# Patient Record
Sex: FEMALE | Race: WHITE
[De-identification: names, ages, dates, MRNs, and addresses within clinical notes are randomized per-mention and may not be internally consistent; named-entity substitution may affect disease eponyms.]

---

## 2018-11-26 ENCOUNTER — HOSPITAL ENCOUNTER (OUTPATIENT)
Dept: HOSPITAL 47 - OR | Age: 48
Discharge: HOME | End: 2018-11-26
Attending: SURGERY
Payer: COMMERCIAL

## 2018-11-26 VITALS — TEMPERATURE: 97 F

## 2018-11-26 VITALS — SYSTOLIC BLOOD PRESSURE: 99 MMHG | DIASTOLIC BLOOD PRESSURE: 65 MMHG | HEART RATE: 76 BPM

## 2018-11-26 VITALS — RESPIRATION RATE: 16 BRPM

## 2018-11-26 VITALS — BODY MASS INDEX: 33.6 KG/M2

## 2018-11-26 DIAGNOSIS — E07.9: ICD-10-CM

## 2018-11-26 DIAGNOSIS — J45.909: ICD-10-CM

## 2018-11-26 DIAGNOSIS — F41.9: ICD-10-CM

## 2018-11-26 DIAGNOSIS — Z79.899: ICD-10-CM

## 2018-11-26 DIAGNOSIS — K80.10: Primary | ICD-10-CM

## 2018-11-26 DIAGNOSIS — F32.9: ICD-10-CM

## 2018-11-26 DIAGNOSIS — Z79.890: ICD-10-CM

## 2018-11-26 DIAGNOSIS — E28.2: ICD-10-CM

## 2018-11-26 DIAGNOSIS — Z79.84: ICD-10-CM

## 2018-11-26 DIAGNOSIS — I10: ICD-10-CM

## 2018-11-26 DIAGNOSIS — Z88.5: ICD-10-CM

## 2018-11-26 DIAGNOSIS — Z79.51: ICD-10-CM

## 2018-11-26 LAB — GLUCOSE BLD-MCNC: 105 MG/DL (ref 75–99)

## 2018-11-26 PROCEDURE — 81025 URINE PREGNANCY TEST: CPT

## 2018-11-26 PROCEDURE — 88304 TISSUE EXAM BY PATHOLOGIST: CPT

## 2018-11-26 PROCEDURE — 47562 LAPAROSCOPIC CHOLECYSTECTOMY: CPT

## 2018-11-26 RX ADMIN — HYDROMORPHONE HYDROCHLORIDE PRN MG: 1 INJECTION, SOLUTION INTRAMUSCULAR; INTRAVENOUS; SUBCUTANEOUS at 10:56

## 2018-11-26 RX ADMIN — HYDROMORPHONE HYDROCHLORIDE PRN MG: 1 INJECTION, SOLUTION INTRAMUSCULAR; INTRAVENOUS; SUBCUTANEOUS at 11:33

## 2018-11-26 NOTE — P.OP
Date of Procedure: 11/26/18


Preoperative Diagnosis: 


Cholecystitis





Cholelithiasis


Postoperative Diagnosis: 


Cholecystitis





Cholelithiasis


Procedure(s) Performed: 


Laparoscopic cholecystectomy


Anesthesia: KEVIN


Surgeon: Rashawn Galindo


Pathology: other (Gallbladder)


Condition: stable


Disposition: PACU


Description of Procedure: 


The patient was placed on the operating table.   The patient received a general 

endotracheal tube anesthesia.    The patients abdomen was prepped and draped 

in the usual sterile fashion.    Through an infraumbilical stab incision, the 

fascia of the anterior abdominal wall was grasped with a pair of Kochers and 

then the Veress needle was placed in the peritoneal cavity.   Position of the 

Veress needle was confirmed with positive drop test.   The abdomen was then 

insufflated.  After adequate insufflation, the 10 mm trocar was placed in the 

peritoneal cavity.    Following this the laparoscope was placed in the 

peritoneal cavity. The patient was placed in the head-up, right side up 

position and then a 5 mm trocar was placed in the right lateral and right 

subcostal position under direct visualization.    A 8 mm trocar was placed in 

the epigastric position.  The gallbladder was grasped in the fundus and 

infundibulum.  Traction  on the gallbladder was placed in the lateral and the 

cephalad positions.  The triangle of Calot  was visualized..   The cystic duct 

was bluntly dissected until the union of the cystic duct and common bile duct 

was seen.    The cystic duct was then divided and sealed with the Harmonic 

scissors.  A PDS Endoloop was then placed throughout the cystic duct stump.  

The cystic artery divided and sealed with the Harmonic scissors.   The 

gallbladder was then removed from the liver bed using Harmonic scissors.   The 

gallbladder was then extracted through the epigastric port site.  Operative 

field was checked for any bleeding spots and Harmonic scissors was used to 

coagulate the liver bed.    The abdomen was irrigated.   The trocars were 

removed.  The skin was closed using interrupted 3-0 Vicryl suture.   Dermabond 

dressing were applied.   The patient tolerated the procedure well.

## 2018-11-26 NOTE — P.GSHP
History of Present Illness


H&P Date: 11/26/18


Chief Complaint: Right upper quadrant pain





This a 48-year-old female said complaints of right quadrant pain.  She is 

workup found have evidence of cholelithiasis.  She presents today for 

laparoscopic cholecystectomy.





Past Medical History


Past Medical History: Asthma, Hypertension, Thyroid Disorder


Additional Past Medical History / Comment(s): PCOS.  GALLBLADDER DISORDER


History of Any Multi-Drug Resistant Organisms: None Reported


Past Surgical History: Orthopedic Surgery, Tonsillectomy


Additional Past Surgical History / Comment(s): D & C AFTER MISCARRIAGE.  ORIF 

WITH HARDWARE RT ANKLE.  RHINOPLASTY


Past Anesthesia/Blood Transfusion Reactions: Previous Problems w/ Anesthesia, 

Postoperative Nausea & Vomiting (PONV)


Additional Past Anesthesia/Blood Transfusion Reaction / Comment(s): AND ITCHING


Smoking Status: Never smoker





- Past Family History


  ** Mother


Family Medical History: No Reported History





Medications and Allergies


 Home Medications











 Medication  Instructions  Recorded  Confirmed  Type


 


Albuterol Inhaler [Ventolin Hfa 1 - 2 puff INHALATION RT-Q6H PRN 11/19/18 11/26/ 18 History





Inhaler]    


 


Atenolol/Chlorthalidone 1 each PO DAILY 11/19/18 11/26/18 History





[Atenolol-Chlorthalidone 50-25]    


 


Beclomethasone Dipropionate [Qvar 1 puff INHALATION DAILY 11/19/18 11/26/18 

History





80 mcg]    


 


Cetirizine HCl [Zyrtec ODT] 10 mg PO DAILY 11/19/18 11/26/18 History


 


Levothyroxine Sodium 125 mcg PO DAILY 11/19/18 11/26/18 History


 


PARoxetine HCL [Paxil] 30 mg PO DAILY 11/19/18 11/26/18 History


 


clonazePAM [KlonoPIN] 0.5 mg PO BID PRN 11/19/18 11/26/18 History


 


metFORMIN HCL [Glucophage Xr] 500 mg PO DAILY 11/19/18 11/26/18 History











 Allergies











Allergy/AdvReac Type Severity Reaction Status Date / Time


 


codeine AdvReac  Nausea Verified 11/26/18 08:28














Surgical - Exam


 Vital Signs











Temp Pulse Resp BP Pulse Ox


 


 98 F   90   18   152/82   99 


 


 11/26/18 08:25  11/26/18 08:25  11/26/18 08:25  11/26/18 08:25  11/26/18 08:25














- General


well developed, no distress





- Eyes


PERRL





- ENT


normal pinna





- Neck


no masses





- Respiratory


normal expansion





- Cardiovascular


Rhythm: regular





- Abdomen


Abdomen: soft, non tender





Results





- Labs


 Abnormal Lab Results - Last 24 Hours (Table)











  11/26/18 Range/Units





  08:31 


 


POC Glucose (mg/dL)  105 H  (75-99)  mg/dL














Assessment and Plan


Assessment: 





Vital pain





Cholelithiasis





We'll perform laparoscopic cholecystectomy.

## 2019-09-14 ENCOUNTER — HOSPITAL ENCOUNTER (OUTPATIENT)
Dept: HOSPITAL 47 - LABWHC1 | Age: 49
Discharge: HOME | End: 2019-09-14
Attending: FAMILY MEDICINE
Payer: COMMERCIAL

## 2019-09-14 DIAGNOSIS — E03.9: ICD-10-CM

## 2019-09-14 DIAGNOSIS — E78.5: Primary | ICD-10-CM

## 2019-09-14 DIAGNOSIS — E11.9: ICD-10-CM

## 2019-09-14 LAB
ALBUMIN SERPL-MCNC: 4.2 G/DL (ref 3.8–4.9)
ALBUMIN/GLOB SERPL: 1.5 G/DL (ref 1.6–3.17)
ALP SERPL-CCNC: 70 U/L (ref 41–126)
ALT SERPL-CCNC: 20 U/L (ref 8–44)
ANION GAP SERPL CALC-SCNC: 10.2 MMOL/L (ref 4–12)
AST SERPL-CCNC: 21 U/L (ref 13–35)
BUN SERPL-SCNC: 13 MG/DL (ref 9–27)
BUN/CREAT SERPL: 16.25 RATIO (ref 12–20)
CALCIUM SPEC-MCNC: 9 MG/DL (ref 8.7–10.3)
CHLORIDE SERPL-SCNC: 103 MMOL/L (ref 96–109)
CHOLEST SERPL-MCNC: 183 MG/DL (ref 0–200)
CO2 SERPL-SCNC: 29.8 MMOL/L (ref 21.6–31.8)
GLOBULIN SER CALC-MCNC: 2.8 G/DL (ref 1.6–3.3)
GLUCOSE SERPL-MCNC: 101 MG/DL (ref 70–110)
HBA1C MFR BLD: 6.1 % (ref 4–6)
HDLC SERPL-MCNC: 41 MG/DL (ref 40–60)
LDLC SERPL CALC-MCNC: 112.4 MG/DL (ref 0–131)
POTASSIUM SERPL-SCNC: 3.9 MMOL/L (ref 3.5–5.5)
PROT SERPL-MCNC: 7 G/DL (ref 6.2–8.2)
SODIUM SERPL-SCNC: 143 MMOL/L (ref 135–145)
TRIGL SERPL-MCNC: 148 MG/DL (ref 0–149)
VLDLC SERPL CALC-MCNC: 29.6 MG/DL (ref 5–40)

## 2019-09-14 PROCEDURE — 80061 LIPID PANEL: CPT

## 2019-09-14 PROCEDURE — 80053 COMPREHEN METABOLIC PANEL: CPT

## 2019-09-14 PROCEDURE — 84443 ASSAY THYROID STIM HORMONE: CPT

## 2019-09-14 PROCEDURE — 36415 COLL VENOUS BLD VENIPUNCTURE: CPT

## 2019-09-14 PROCEDURE — 83036 HEMOGLOBIN GLYCOSYLATED A1C: CPT

## 2020-02-01 ENCOUNTER — HOSPITAL ENCOUNTER (EMERGENCY)
Dept: HOSPITAL 47 - EC | Age: 50
Discharge: HOME | End: 2020-02-01
Payer: COMMERCIAL

## 2020-02-01 VITALS
SYSTOLIC BLOOD PRESSURE: 144 MMHG | TEMPERATURE: 98.4 F | HEART RATE: 91 BPM | RESPIRATION RATE: 18 BRPM | DIASTOLIC BLOOD PRESSURE: 79 MMHG

## 2020-02-01 DIAGNOSIS — F41.9: ICD-10-CM

## 2020-02-01 DIAGNOSIS — Z79.890: ICD-10-CM

## 2020-02-01 DIAGNOSIS — E86.0: ICD-10-CM

## 2020-02-01 DIAGNOSIS — Z88.5: ICD-10-CM

## 2020-02-01 DIAGNOSIS — Z79.51: ICD-10-CM

## 2020-02-01 DIAGNOSIS — K52.9: Primary | ICD-10-CM

## 2020-02-01 DIAGNOSIS — Z87.19: ICD-10-CM

## 2020-02-01 DIAGNOSIS — I10: ICD-10-CM

## 2020-02-01 DIAGNOSIS — F32.9: ICD-10-CM

## 2020-02-01 DIAGNOSIS — J45.909: ICD-10-CM

## 2020-02-01 DIAGNOSIS — E07.9: ICD-10-CM

## 2020-02-01 DIAGNOSIS — Z79.899: ICD-10-CM

## 2020-02-01 DIAGNOSIS — E87.6: ICD-10-CM

## 2020-02-01 LAB
ALBUMIN SERPL-MCNC: 3.9 G/DL (ref 3.5–5)
ALP SERPL-CCNC: 74 U/L (ref 38–126)
ALT SERPL-CCNC: 16 U/L (ref 4–34)
AMYLASE SERPL-CCNC: 50 U/L (ref 30–110)
ANION GAP SERPL CALC-SCNC: 10 MMOL/L
AST SERPL-CCNC: 25 U/L (ref 14–36)
BASOPHILS # BLD AUTO: 0.1 K/UL (ref 0–0.2)
BASOPHILS NFR BLD AUTO: 1 %
BUN SERPL-SCNC: 12 MG/DL (ref 7–17)
CALCIUM SPEC-MCNC: 8.8 MG/DL (ref 8.4–10.2)
CHLORIDE SERPL-SCNC: 104 MMOL/L (ref 98–107)
CO2 SERPL-SCNC: 25 MMOL/L (ref 22–30)
EOSINOPHIL # BLD AUTO: 0.3 K/UL (ref 0–0.7)
EOSINOPHIL NFR BLD AUTO: 4 %
ERYTHROCYTE [DISTWIDTH] IN BLOOD BY AUTOMATED COUNT: 4.94 M/UL (ref 3.8–5.4)
ERYTHROCYTE [DISTWIDTH] IN BLOOD: 17 % (ref 11.5–15.5)
GLUCOSE SERPL-MCNC: 152 MG/DL (ref 74–99)
HCT VFR BLD AUTO: 34.2 % (ref 34–46)
HGB BLD-MCNC: 10 GM/DL (ref 11.4–16)
LYMPHOCYTES # SPEC AUTO: 1.8 K/UL (ref 1–4.8)
LYMPHOCYTES NFR SPEC AUTO: 31 %
MCH RBC QN AUTO: 20.2 PG (ref 25–35)
MCHC RBC AUTO-ENTMCNC: 29.2 G/DL (ref 31–37)
MCV RBC AUTO: 69.3 FL (ref 80–100)
MONOCYTES # BLD AUTO: 0.4 K/UL (ref 0–1)
MONOCYTES NFR BLD AUTO: 7 %
NEUTROPHILS # BLD AUTO: 3.3 K/UL (ref 1.3–7.7)
NEUTROPHILS NFR BLD AUTO: 54 %
PH UR: 6 [PH] (ref 5–8)
PLATELET # BLD AUTO: 308 K/UL (ref 150–450)
POTASSIUM SERPL-SCNC: 2.9 MMOL/L (ref 3.5–5.1)
PROT SERPL-MCNC: 7.3 G/DL (ref 6.3–8.2)
SODIUM SERPL-SCNC: 139 MMOL/L (ref 137–145)
SP GR UR: 1.01 (ref 1–1.03)
UROBILINOGEN UR QL STRIP: <2 MG/DL (ref ?–2)
WBC # BLD AUTO: 6.1 K/UL (ref 3.8–10.6)

## 2020-02-01 PROCEDURE — 85025 COMPLETE CBC W/AUTO DIFF WBC: CPT

## 2020-02-01 PROCEDURE — 96375 TX/PRO/DX INJ NEW DRUG ADDON: CPT

## 2020-02-01 PROCEDURE — 83735 ASSAY OF MAGNESIUM: CPT

## 2020-02-01 PROCEDURE — 96374 THER/PROPH/DIAG INJ IV PUSH: CPT

## 2020-02-01 PROCEDURE — 80053 COMPREHEN METABOLIC PANEL: CPT

## 2020-02-01 PROCEDURE — 99285 EMERGENCY DEPT VISIT HI MDM: CPT

## 2020-02-01 PROCEDURE — 36415 COLL VENOUS BLD VENIPUNCTURE: CPT

## 2020-02-01 PROCEDURE — 74018 RADEX ABDOMEN 1 VIEW: CPT

## 2020-02-01 PROCEDURE — 81003 URINALYSIS AUTO W/O SCOPE: CPT

## 2020-02-01 PROCEDURE — 96361 HYDRATE IV INFUSION ADD-ON: CPT

## 2020-02-01 PROCEDURE — 82150 ASSAY OF AMYLASE: CPT

## 2020-02-01 PROCEDURE — 83690 ASSAY OF LIPASE: CPT

## 2020-02-01 NOTE — XR
EXAMINATION TYPE: XR KUB , 3 VIEWS

 

DATE OF EXAM ORDERED: 2/1/2020

 

HISTORY: Abdominal pain, nausea and vomiting.

 

COMPARISON: None.

 

FINDINGS:  There is apparent elevation right hemidiaphragm. The lung bases are clear.

 

Within the abdomen, the abdominal gas pattern is within normal limits. There is no evidence of obstru
ction or free air. There are phleboliths within the pelvis.

 

IMPRESSION: 

 

NO ACUTE INTRA-ABDOMINAL ABNORMALITY.

## 2020-02-01 NOTE — ED
Nausea/Vomiting/Diarrhea HPI





- General


Chief complaint: Nausea/Vomiting/Diarrhea


Stated complaint: Vomiting


Time Seen by Provider: 02/01/20 09:17


Source: patient, RN notes reviewed, old records reviewed


Mode of arrival: EMS


Limitations: no limitations





- History of Present Illness


Initial comments: 





Injury is a 49-year-old female, who presents emergency department today for 

evaluation for nausea and vomiting.  Symptoms starting this morning.  She also c

omplains some dizziness, lightheadedness feeling with vomiting.  Patient states 

that she ablation and food poisoning.  She said she was feeling well yesterday. 

She denies any specific abdominal pain.  Denies any changes in urination or 

bowel habits.  She does state that she has had some abdominal cramping since 

arriving to the emergency department states feels like she may have some 

diarrhea.





- Related Data


                                Home Medications











 Medication  Instructions  Recorded  Confirmed


 


Albuterol Inhaler [Ventolin Hfa 1 - 2 puff INHALATION RT-Q6H PRN 11/19/18 11/26/18





Inhaler]   


 


Atenolol/Chlorthalidone 1 each PO DAILY 11/19/18 11/26/18





[Atenolol-Chlorthalidone 50-25]   


 


Beclomethasone Dipropionate [Qvar 1 puff INHALATION DAILY 11/19/18 11/26/18





80 mcg]   


 


Cetirizine HCl [Zyrtec ODT] 10 mg PO DAILY 11/19/18 11/26/18


 


Levothyroxine Sodium 125 mcg PO DAILY 11/19/18 11/26/18


 


PARoxetine HCL [Paxil] 30 mg PO DAILY 11/19/18 11/26/18


 


clonazePAM [KlonoPIN] 0.5 mg PO BID PRN 11/19/18 11/26/18


 


metFORMIN HCL [Glucophage Xr] 500 mg PO DAILY 11/19/18 11/26/18








                                  Previous Rx's











 Medication  Instructions  Recorded


 


Docusate [Colace] 100 mg PO BID #20 capsule 11/26/18


 


HYDROcodone/APAP 7.5-325MG [Norco 1 tab PO Q4H PRN 3 Days #18 tab 11/26/18





7.5-325]  


 


Ondansetron Odt [Zofran Odt] 4 mg PO Q8HR PRN #12 tab 02/01/20


 


Potassium Chloride ER [K-Dur 20] 20 meq PO DAILY #3 tab 02/01/20











                                    Allergies











Allergy/AdvReac Type Severity Reaction Status Date / Time


 


codeine AdvReac  Nausea Verified 02/01/20 09:20














Review of Systems


ROS Statement: 


Those systems with pertinent positive or pertinent negative responses have been 

documented in the HPI.





ROS Other: All systems not noted in ROS Statement are negative.





Past Medical History


Past Medical History: Asthma, Hypertension, Thyroid Disorder


Additional Past Medical History / Comment(s): PCOS.  GALLBLADDER DISORDER


History of Any Multi-Drug Resistant Organisms: None Reported


Past Surgical History: Orthopedic Surgery, Tonsillectomy


Additional Past Surgical History / Comment(s): D & C AFTER MISCARRIAGE.  ORIF 

WITH HARDWARE RT ANKLE.  RHINOPLASTY


Past Anesthesia/Blood Transfusion Reactions: Previous Problems w/ Anesthesia, 

Postoperative Nausea & Vomiting (PONV)


Additional Past Anesthesia/Blood Transfusion Reaction / Comment(s): AND ITCHING


Past Psychological History: Anxiety, Depression


Smoking Status: Never smoker





- Past Family History


  ** Mother


Family Medical History: No Reported History





General Exam


Limitations: no limitations


General appearance: alert, in no apparent distress


Head exam: Present: atraumatic, normocephalic, normal inspection


Eye exam: Present: normal appearance, PERRL, EOMI.  Absent: scleral icterus, 

conjunctival injection, periorbital swelling


ENT exam: Present: normal exam, mucous membranes moist


Neck exam: Present: normal inspection


Respiratory exam: Present: normal lung sounds bilaterally.  Absent: respiratory 

distress, wheezes, rales, rhonchi, stridor


Cardiovascular Exam: Present: regular rate, normal rhythm, normal heart sounds. 

Absent: systolic murmur, diastolic murmur, rubs, gallop, clicks


GI/Abdominal exam: Present: soft, normal bowel sounds.  Absent: distended, 

tenderness, guarding, rebound, rigid


Extremities exam: Present: normal inspection, full ROM, normal capillary refill.

 Absent: tenderness, pedal edema, joint swelling, calf tenderness


Back exam: Present: normal inspection


Neurological exam: Present: alert, oriented X3, CN II-XII intact


Psychiatric exam: Present: normal affect, normal mood


Skin exam: Present: warm, dry, intact, normal color.  Absent: rash





Course


                                   Vital Signs











  02/01/20





  09:17


 


Temperature 97.8 F


 


Pulse Rate 76


 


Respiratory 16





Rate 


 


Blood Pressure 143/82


 


O2 Sat by Pulse 95





Oximetry 














Medical Decision Making





- Medical Decision Making





49-year-old female presents with 1 day of nausea and vomiting, feeling like she 

cannot diarrhea.  Concern for viral gastritis.  She has no abdominal pain.  

Vital signs are stable.  She did have some hypokalemia due to vomiting.  She is 

given oral potassium replacement did tolerate this by mouth.  Labs are otherwise

unremarkable.  Discussed pros follow-up with primary care doctor.  Return 

parameters were discussed.





- Lab Data


Result diagrams: 


                                 02/01/20 09:28





                                 02/01/20 09:28


                                   Lab Results











  02/01/20 02/01/20 02/01/20 Range/Units





  09:28 09:28 09:28 


 


WBC   6.1   (3.8-10.6)  k/uL


 


RBC   4.94   (3.80-5.40)  m/uL


 


Hgb   10.0 L   (11.4-16.0)  gm/dL


 


Hct   34.2   (34.0-46.0)  %


 


MCV   69.3 L   (80.0-100.0)  fL


 


MCH   20.2 L   (25.0-35.0)  pg


 


MCHC   29.2 L   (31.0-37.0)  g/dL


 


RDW   17.0 H   (11.5-15.5)  %


 


Plt Count   308   (150-450)  k/uL


 


Neutrophils %   54   %


 


Lymphocytes %   31   %


 


Monocytes %   7   %


 


Eosinophils %   4   %


 


Basophils %   1   %


 


Neutrophils #   3.3   (1.3-7.7)  k/uL


 


Lymphocytes #   1.8   (1.0-4.8)  k/uL


 


Monocytes #   0.4   (0-1.0)  k/uL


 


Eosinophils #   0.3   (0-0.7)  k/uL


 


Basophils #   0.1   (0-0.2)  k/uL


 


Hypochromasia   Marked   


 


Poikilocytosis   Slight   


 


Anisocytosis   Slight   


 


Microcytosis   Marked   


 


Sodium  139    (137-145)  mmol/L


 


Potassium  2.9 L    (3.5-5.1)  mmol/L


 


Chloride  104    ()  mmol/L


 


Carbon Dioxide  25    (22-30)  mmol/L


 


Anion Gap  10    mmol/L


 


BUN  12    (7-17)  mg/dL


 


Creatinine  0.61    (0.52-1.04)  mg/dL


 


Est GFR (CKD-EPI)AfAm  >90    (>60 ml/min/1.73 sqM)  


 


Est GFR (CKD-EPI)NonAf  >90    (>60 ml/min/1.73 sqM)  


 


Glucose  152 H    (74-99)  mg/dL


 


Calcium  8.8    (8.4-10.2)  mg/dL


 


Magnesium    1.9  (1.6-2.3)  mg/dL


 


Total Bilirubin  0.6    (0.2-1.3)  mg/dL


 


AST  25    (14-36)  U/L


 


ALT  16    (4-34)  U/L


 


Alkaline Phosphatase  74    ()  U/L


 


Total Protein  7.3    (6.3-8.2)  g/dL


 


Albumin  3.9    (3.5-5.0)  g/dL


 


Amylase  50    ()  U/L


 


Lipase  172    ()  U/L


 


Urine Color     


 


Urine Appearance     (Clear)  


 


Urine pH     (5.0-8.0)  


 


Ur Specific Gravity     (1.001-1.035)  


 


Urine Protein     (Negative)  


 


Urine Glucose (UA)     (Negative)  


 


Urine Ketones     (Negative)  


 


Urine Blood     (Negative)  


 


Urine Nitrite     (Negative)  


 


Urine Bilirubin     (Negative)  


 


Urine Urobilinogen     (<2.0)  mg/dL


 


Ur Leukocyte Esterase     (Negative)  














  02/01/20 Range/Units





  12:02 


 


WBC   (3.8-10.6)  k/uL


 


RBC   (3.80-5.40)  m/uL


 


Hgb   (11.4-16.0)  gm/dL


 


Hct   (34.0-46.0)  %


 


MCV   (80.0-100.0)  fL


 


MCH   (25.0-35.0)  pg


 


MCHC   (31.0-37.0)  g/dL


 


RDW   (11.5-15.5)  %


 


Plt Count   (150-450)  k/uL


 


Neutrophils %   %


 


Lymphocytes %   %


 


Monocytes %   %


 


Eosinophils %   %


 


Basophils %   %


 


Neutrophils #   (1.3-7.7)  k/uL


 


Lymphocytes #   (1.0-4.8)  k/uL


 


Monocytes #   (0-1.0)  k/uL


 


Eosinophils #   (0-0.7)  k/uL


 


Basophils #   (0-0.2)  k/uL


 


Hypochromasia   


 


Poikilocytosis   


 


Anisocytosis   


 


Microcytosis   


 


Sodium   (137-145)  mmol/L


 


Potassium   (3.5-5.1)  mmol/L


 


Chloride   ()  mmol/L


 


Carbon Dioxide   (22-30)  mmol/L


 


Anion Gap   mmol/L


 


BUN   (7-17)  mg/dL


 


Creatinine   (0.52-1.04)  mg/dL


 


Est GFR (CKD-EPI)AfAm   (>60 ml/min/1.73 sqM)  


 


Est GFR (CKD-EPI)NonAf   (>60 ml/min/1.73 sqM)  


 


Glucose   (74-99)  mg/dL


 


Calcium   (8.4-10.2)  mg/dL


 


Magnesium   (1.6-2.3)  mg/dL


 


Total Bilirubin   (0.2-1.3)  mg/dL


 


AST   (14-36)  U/L


 


ALT   (4-34)  U/L


 


Alkaline Phosphatase   ()  U/L


 


Total Protein   (6.3-8.2)  g/dL


 


Albumin   (3.5-5.0)  g/dL


 


Amylase   ()  U/L


 


Lipase   ()  U/L


 


Urine Color  Light Yellow  


 


Urine Appearance  Clear  (Clear)  


 


Urine pH  6.0  (5.0-8.0)  


 


Ur Specific Gravity  1.013  (1.001-1.035)  


 


Urine Protein  Negative  (Negative)  


 


Urine Glucose (UA)  Negative  (Negative)  


 


Urine Ketones  Negative  (Negative)  


 


Urine Blood  Negative  (Negative)  


 


Urine Nitrite  Negative  (Negative)  


 


Urine Bilirubin  Negative  (Negative)  


 


Urine Urobilinogen  <2.0  (<2.0)  mg/dL


 


Ur Leukocyte Esterase  Negative  (Negative)  














- Radiology Data


Radiology results: report reviewed


Normal bowel gas pattern and KUB.





Disposition


Clinical Impression: 


 Dehydration, Hypokalemia, Gastroenteritis





Disposition: HOME SELF-CARE


Condition: Good


Instructions (If sedation given, give patient instructions):  Acute Nausea and 

Vomiting (ED)


Additional Instructions: 


Please use medication as discussed. Please follow up with family doctor if 

symptoms have not improved over the next two days. Please return to the 

emergency room if your symptoms increase or worsen or for any other concerns.


Prescriptions: 


Potassium Chloride ER [K-Dur 20] 20 meq PO DAILY #3 tab


Ondansetron Odt [Zofran Odt] 4 mg PO Q8HR PRN #12 tab


 PRN Reason: Nausea


Is patient prescribed a controlled substance at d/c from ED?: No


Referrals: 


Scott Rangel DO [Primary Care Provider] - 1-2 days

## 2022-04-18 ENCOUNTER — HOSPITAL ENCOUNTER (OUTPATIENT)
Dept: HOSPITAL 47 - RADMAMWWP | Age: 52
Discharge: HOME | End: 2022-04-18
Attending: OBSTETRICS & GYNECOLOGY
Payer: COMMERCIAL

## 2022-04-18 DIAGNOSIS — Z53.9: Primary | ICD-10-CM
